# Patient Record
Sex: MALE | Race: WHITE | NOT HISPANIC OR LATINO | Employment: STUDENT | URBAN - METROPOLITAN AREA
[De-identification: names, ages, dates, MRNs, and addresses within clinical notes are randomized per-mention and may not be internally consistent; named-entity substitution may affect disease eponyms.]

---
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
## 2025-04-03 ENCOUNTER — APPOINTMENT (OUTPATIENT)
Dept: NEUROLOGY | Facility: CLINIC | Age: 20
End: 2025-04-03
Payer: COMMERCIAL

## 2025-05-01 ENCOUNTER — APPOINTMENT (OUTPATIENT)
Dept: NEUROLOGY | Facility: CLINIC | Age: 20
End: 2025-05-01
Payer: COMMERCIAL

## 2025-06-30 ENCOUNTER — OFFICE VISIT (OUTPATIENT)
Dept: PEDIATRICS | Facility: CLINIC | Age: 20
End: 2025-06-30
Payer: COMMERCIAL

## 2025-06-30 VITALS — BODY MASS INDEX: 24.73 KG/M2 | TEMPERATURE: 98 F | WEIGHT: 182.6 LBS | HEIGHT: 72 IN

## 2025-06-30 DIAGNOSIS — Z91.89 RISK OF EXPOSURE TO LYME DISEASE: ICD-10-CM

## 2025-06-30 DIAGNOSIS — S50.861A TICK BITE OF RIGHT FOREARM, INITIAL ENCOUNTER: Primary | ICD-10-CM
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
## 2025-06-30 PROCEDURE — 99213 OFFICE O/P EST LOW 20 MIN: CPT | Performed by: PEDIATRICS

## 2025-06-30 PROCEDURE — 3008F BODY MASS INDEX DOCD: CPT | Performed by: PEDIATRICS

## 2025-06-30 PROCEDURE — 1036F TOBACCO NON-USER: CPT | Performed by: PEDIATRICS

## 2025-06-30 RX ORDER — DOXYCYCLINE HYCLATE 100 MG
TABLET ORAL
Qty: 2 TABLET | Refills: 0 | Status: SHIPPED | OUTPATIENT
Start: 2025-06-30

## 2025-06-30 RX ORDER — ACETAMINOPHEN 160 MG
TABLET,DISINTEGRATING ORAL
COMMUNITY

## 2025-06-30 NOTE — PROGRESS NOTES
Subjective   Patient ID: Reilly Nevarez is a 19 y.o. male who presents for Tick Removal (Removed, possibly attached since Sat).  Today he is accompanied by alone.     HPI  Pt noted tick on R forearm this am.   Tick was embedded and pt removed by pulling on tick.      Started hiking in [facility] 4d prev.    Noted initial tick on sock 2d prev  Did not notice any other tick after return from hike 2d prev.      No current rash, arthritis, fever.      Pt brought tick with him  Appears to be a male deer tic.      ROS negative except what is noted in HPI    Objective   Temp 36.7 °C (98 °F)   Ht 1.829 m (6')   Wt 82.8 kg (182 lb 9.6 oz)   BMI 24.77 kg/m²   BSA: 2.05 meters squared  Growth percentiles: 80 %ile (Z= 0.85) based on CDC (Boys, 2-20 Years) Stature-for-age data based on Stature recorded on 6/30/2025. 83 %ile (Z= 0.94) based on CDC (Boys, 2-20 Years) weight-for-age data using data from 6/30/2025.     Physical Exam  Alert male nad  Skin small puncture wound middle of R forearm, no rash.     Assessment/Plan   Tick bite in Lyme endemic area  ? Length of tick bite  Will add doxycycline 200mg once  Call if fever, arthritis or rash    Problem List Items Addressed This Visit    None

## 2025-06-30 NOTE — PATIENT INSTRUCTIONS
Tick bite in Lyme endemic area  ? Length of tick bite  Will add doxycycline 200mg once  Call if fever, arthritis or rash